# Patient Record
Sex: FEMALE | Race: BLACK OR AFRICAN AMERICAN | NOT HISPANIC OR LATINO | Employment: UNEMPLOYED | RURAL
[De-identification: names, ages, dates, MRNs, and addresses within clinical notes are randomized per-mention and may not be internally consistent; named-entity substitution may affect disease eponyms.]

---

## 2015-04-29 LAB — CRC RECOMMENDATION EXT: NORMAL

## 2022-02-07 ENCOUNTER — OFFICE VISIT (OUTPATIENT)
Dept: FAMILY MEDICINE | Facility: CLINIC | Age: 57
End: 2022-02-07
Payer: OTHER GOVERNMENT

## 2022-02-07 VITALS
HEART RATE: 88 BPM | WEIGHT: 265.63 LBS | TEMPERATURE: 98 F | HEIGHT: 64 IN | OXYGEN SATURATION: 91 % | BODY MASS INDEX: 45.35 KG/M2 | DIASTOLIC BLOOD PRESSURE: 83 MMHG | SYSTOLIC BLOOD PRESSURE: 151 MMHG

## 2022-02-07 DIAGNOSIS — Z79.899 ENCOUNTER FOR LONG-TERM CURRENT USE OF MEDICATION: ICD-10-CM

## 2022-02-07 DIAGNOSIS — K21.9 GASTROESOPHAGEAL REFLUX DISEASE, UNSPECIFIED WHETHER ESOPHAGITIS PRESENT: ICD-10-CM

## 2022-02-07 DIAGNOSIS — E11.9 TYPE 2 DIABETES MELLITUS WITHOUT COMPLICATION, UNSPECIFIED WHETHER LONG TERM INSULIN USE: Primary | ICD-10-CM

## 2022-02-07 DIAGNOSIS — R20.8 BURNING SENSATION OF SKIN: ICD-10-CM

## 2022-02-07 LAB
ALBUMIN SERPL BCP-MCNC: 3.2 G/DL (ref 3.5–5)
ALBUMIN/GLOB SERPL: 0.9 {RATIO}
ALP SERPL-CCNC: 60 U/L (ref 46–118)
ALT SERPL W P-5'-P-CCNC: 22 U/L (ref 13–56)
ANION GAP SERPL CALCULATED.3IONS-SCNC: 5 MMOL/L (ref 7–16)
AST SERPL W P-5'-P-CCNC: 22 U/L (ref 15–37)
BILIRUB SERPL-MCNC: 0.8 MG/DL (ref 0–1.2)
BUN SERPL-MCNC: 19 MG/DL (ref 7–18)
BUN/CREAT SERPL: 17 (ref 6–20)
CALCIUM SERPL-MCNC: 8.8 MG/DL (ref 8.5–10.1)
CHLORIDE SERPL-SCNC: 107 MMOL/L (ref 98–107)
CHOLEST SERPL-MCNC: 136 MG/DL (ref 0–200)
CHOLEST/HDLC SERPL: 2.9 {RATIO}
CO2 SERPL-SCNC: 34 MMOL/L (ref 21–32)
CREAT SERPL-MCNC: 1.15 MG/DL (ref 0.55–1.02)
EKG 12-LEAD: NORMAL
EST. AVERAGE GLUCOSE BLD GHB EST-MCNC: 247 MG/DL
GLOBULIN SER-MCNC: 3.4 G/DL (ref 2–4)
GLUCOSE SERPL-MCNC: 182 MG/DL (ref 74–106)
HBA1C MFR BLD HPLC: 10 % (ref 4.5–6.6)
HDLC SERPL-MCNC: 47 MG/DL (ref 40–60)
LDLC SERPL CALC-MCNC: 47 MG/DL
LDLC/HDLC SERPL: 1 {RATIO}
NONHDLC SERPL-MCNC: 89 MG/DL
POTASSIUM SERPL-SCNC: 4.2 MMOL/L (ref 3.5–5.1)
PR INTERVAL: NORMAL
PROT SERPL-MCNC: 6.6 G/DL (ref 6.4–8.2)
PRT AXES: NORMAL
QRS DURATION: NORMAL
QT/QTC: NORMAL
SODIUM SERPL-SCNC: 142 MMOL/L (ref 136–145)
TRIGL SERPL-MCNC: 212 MG/DL (ref 35–150)
UREA BREATH TEST QL: NEGATIVE
VENTRICULAR RATE: NORMAL
VLDLC SERPL-MCNC: 42 MG/DL

## 2022-02-07 PROCEDURE — 80061 LIPID PANEL: CPT | Mod: ,,, | Performed by: CLINICAL MEDICAL LABORATORY

## 2022-02-07 PROCEDURE — 93005 ELECTROCARDIOGRAM TRACING: CPT | Mod: GC,,, | Performed by: SPECIALIST

## 2022-02-07 PROCEDURE — 80053 COMPREHENSIVE METABOLIC PANEL: ICD-10-PCS | Mod: ,,, | Performed by: CLINICAL MEDICAL LABORATORY

## 2022-02-07 PROCEDURE — 80053 COMPREHEN METABOLIC PANEL: CPT | Mod: ,,, | Performed by: CLINICAL MEDICAL LABORATORY

## 2022-02-07 PROCEDURE — 83014 H PYLORI DRUG ADMIN: CPT | Mod: ,,, | Performed by: CLINICAL MEDICAL LABORATORY

## 2022-02-07 PROCEDURE — 83013 H. PYLORI BREATH TEST: ICD-10-PCS | Mod: ,,, | Performed by: CLINICAL MEDICAL LABORATORY

## 2022-02-07 PROCEDURE — 83014 H. PYLORI BREATH TEST: ICD-10-PCS | Mod: ,,, | Performed by: CLINICAL MEDICAL LABORATORY

## 2022-02-07 PROCEDURE — 93005 POCT EKG 12-LEAD: ICD-10-PCS | Mod: GC,,, | Performed by: SPECIALIST

## 2022-02-07 PROCEDURE — 99204 OFFICE O/P NEW MOD 45 MIN: CPT | Mod: GC,,, | Performed by: SPECIALIST

## 2022-02-07 PROCEDURE — 99204 PR OFFICE/OUTPT VISIT, NEW, LEVL IV, 45-59 MIN: ICD-10-PCS | Mod: GC,,, | Performed by: SPECIALIST

## 2022-02-07 PROCEDURE — 83013 H PYLORI (C-13) BREATH: CPT | Mod: ,,, | Performed by: CLINICAL MEDICAL LABORATORY

## 2022-02-07 PROCEDURE — 80061 LIPID PANEL: ICD-10-PCS | Mod: ,,, | Performed by: CLINICAL MEDICAL LABORATORY

## 2022-02-07 PROCEDURE — 83036 HEMOGLOBIN GLYCOSYLATED A1C: CPT | Mod: ,,, | Performed by: CLINICAL MEDICAL LABORATORY

## 2022-02-07 PROCEDURE — 83036 HEMOGLOBIN A1C: ICD-10-PCS | Mod: ,,, | Performed by: CLINICAL MEDICAL LABORATORY

## 2022-02-07 RX ORDER — DIVALPROEX SODIUM 250 MG/1
500 TABLET, DELAYED RELEASE ORAL 2 TIMES DAILY
COMMUNITY
Start: 2021-12-09 | End: 2022-02-14

## 2022-02-07 RX ORDER — VALSARTAN 160 MG/1
160 TABLET ORAL DAILY
COMMUNITY
Start: 2021-12-09 | End: 2022-02-14 | Stop reason: ALTCHOICE

## 2022-02-07 RX ORDER — ALPRAZOLAM 1 MG/1
1 TABLET ORAL DAILY
COMMUNITY
Start: 2021-12-09 | End: 2022-02-14

## 2022-02-07 RX ORDER — SITAGLIPTIN AND METFORMIN HYDROCHLORIDE 1000; 50 MG/1; MG/1
1 TABLET, FILM COATED ORAL 2 TIMES DAILY WITH MEALS
COMMUNITY
Start: 2022-01-17

## 2022-02-07 RX ORDER — PAROXETINE HYDROCHLORIDE 20 MG/1
TABLET, FILM COATED ORAL
COMMUNITY
Start: 2021-12-09 | End: 2022-02-14

## 2022-02-07 RX ORDER — TRAMADOL HYDROCHLORIDE 50 MG/1
TABLET ORAL
COMMUNITY
Start: 2021-08-23 | End: 2022-02-14

## 2022-02-07 RX ORDER — HYDROXYZINE PAMOATE 25 MG/1
CAPSULE ORAL
COMMUNITY
Start: 2021-12-14 | End: 2022-02-14

## 2022-02-07 RX ORDER — ONDANSETRON 4 MG/1
TABLET, ORALLY DISINTEGRATING ORAL
COMMUNITY
Start: 2022-01-27 | End: 2022-02-14

## 2022-02-07 RX ORDER — SUCRALFATE 1 G/1
TABLET ORAL
COMMUNITY
Start: 2022-01-27 | End: 2022-02-14 | Stop reason: ALTCHOICE

## 2022-02-07 RX ORDER — FAMOTIDINE 40 MG/1
40 TABLET, FILM COATED ORAL DAILY
Qty: 30 TABLET | Refills: 11 | Status: SHIPPED | OUTPATIENT
Start: 2022-02-07 | End: 2023-02-07

## 2022-02-07 RX ORDER — DICLOFENAC SODIUM 10 MG/G
2 GEL TOPICAL 4 TIMES DAILY
Qty: 100 G | Refills: 0 | Status: SHIPPED | OUTPATIENT
Start: 2022-02-07

## 2022-02-07 NOTE — PROGRESS NOTES
"      Chris Arana, DO  [address], [place] [address]  Phone: [phone]     Subjective     Name: Margie Davidson   YOB: 1965 (56 y.o.)  MRN: [id number]  Visit Date: 02/07/2022   Chief Complaint: Gastroesophageal Reflux and Arm Pain (Armpit (feels a burning sensation) x 1 month )        HISTORY OF PRESENT ILLNESS:  Pt is a 55 yo aa female presenting today with cc of "diabetic complaints, neuropathy, and GERD." She states that she began experiencing neuropathy/burning of her right breast in December and that she is wanting to establish care with another PCP in attempts to better control her diabetes. She denies any rash to the breast and insists that it is from neuropathy. After a lengthy discussion, there appears to be some confusion between the patient reported medications and the medications in system, with many medications belonging to the same class. She also states that her most recent a1c was approximately 10 and that her BG is approximately 50 in the mornings; however, there is confusion with her diabetic medications as well. She states that she has also been experiencing GERD for many years and that it is not controlled with OTC PPIs. At this time, I am uncomfortable making changes to any of her medications without knowing current medications and dosages. I have instructed the patient to follow up in 1 week and to bring all medications with her at that time. I will start Pepcid for GERD and Voltaren gel for her burning until we can prescribe a more appropriate medication in 1 week.        PAST MEDICAL HISTORY:  Significant Diagnoses - Patient  has a past medical history of Diabetes mellitus, type 2, GERD (gastroesophageal reflux disease), and Hyperlipidemia.  Medications - Patient has a current medication list which includes the following long-term medication(s): alprazolam, diclofenac sodium, divalproex, famotidine, janumet, paroxetine, and valsartan.   Allergies - Patient has No " Known Allergies.  Surgeries - Patient  has a past surgical history that includes Hysterectomy; Surgical removal of ulcer; and Tubal ligation.  Family History - Patient family history is not on file.      SOCIAL HISTORY:  Tobacco - Patient  reports that she has never smoked. She has never used smokeless tobacco.   Alcohol - Patient  reports no history of alcohol use.   Recreational Drugs - Patient  reports no history of drug use.       Review of Systems   Constitutional: Negative for chills, diaphoresis, fatigue and fever.   Respiratory: Negative for cough, chest tightness, shortness of breath and wheezing.    Cardiovascular: Negative for chest pain, palpitations, leg swelling and claudication.   Gastrointestinal: Positive for nausea and reflux. Negative for abdominal pain, diarrhea and vomiting.   Neurological: Negative for dizziness, syncope, weakness, light-headedness and headaches.   All other systems reviewed and are negative.         Past Medical History:   Diagnosis Date    Diabetes mellitus, type 2     GERD (gastroesophageal reflux disease)     Hyperlipidemia         Review of patient's allergies indicates:  No Known Allergies     Past Surgical History:   Procedure Laterality Date    HYSTERECTOMY      partial    SURGICAL REMOVAL OF ULCER      TUBAL LIGATION          History reviewed. No pertinent family history.    Current Outpatient Medications   Medication Instructions    ALPRAZolam (XANAX) 1 mg, Oral, Daily    diclofenac sodium (VOLTAREN) 2 g, Topical (Top), 4 times daily    divalproex (DEPAKOTE) 500 mg, Oral, 2 times daily    famotidine (PEPCID) 40 mg, Oral, Daily    hydrOXYzine pamoate (VISTARIL) 25 MG Cap TAKE 1 CAPSULE BY MOUTH 4 TIMES DAILY AS NEEDED FOR 5 DAYS    JANUMET 50-1,000 mg per tablet 1 tablet, Oral, 2 times daily with meals    ondansetron (ZOFRAN-ODT) 4 MG TbDL TAKE 1 TABLET AND PLACE ON TOP OF THE TONGUE WHERE THEY WILL DISSOLVE EVERY 4-6 HOURS AS NEEDED FOR NAUSEA     "paroxetine (PAXIL) 20 MG tablet TAKE 1/2 (ONE-HALF) TABLET BY MOUTH ONCE DAILY FOR 2 WEEKS,THEN TAKE 1 TABLET ONCE DAY FOR 2 WEEKS    sucralfate (CARAFATE) 1 gram tablet TAKE ONE TABLET BY MOUTH 30 MINUTES BEFORE MEALS AND AT BEDTIME.    traMADoL (ULTRAM) 50 mg tablet No dose, route, or frequency recorded.    valsartan (DIOVAN) 160 mg, Oral, Daily        Objective     BP (!) 151/83   Pulse 88   Temp 98 °F (36.7 °C) (Temporal)   Ht 5' 4" (1.626 m)   Wt 120.5 kg (265 lb 9.6 oz)   SpO2 (!) 91%   BMI 45.59 kg/m²     Physical Exam  Vitals and nursing note reviewed.   Constitutional:       General: She is not in acute distress.     Appearance: Normal appearance. She is well-developed. She is obese. She is not ill-appearing.   HENT:      Head: Normocephalic and atraumatic.      Nose: Nose normal. No congestion or rhinorrhea.      Mouth/Throat:      Mouth: Mucous membranes are moist.      Pharynx: Oropharynx is clear. No oropharyngeal exudate or posterior oropharyngeal erythema.   Eyes:      General: No scleral icterus.     Extraocular Movements: Extraocular movements intact.      Conjunctiva/sclera: Conjunctivae normal.      Pupils: Pupils are equal, round, and reactive to light.   Cardiovascular:      Rate and Rhythm: Normal rate and regular rhythm.      Pulses: Normal pulses.      Heart sounds: Normal heart sounds, S1 normal and S2 normal. No murmur heard.  No friction rub. No gallop. No S3 or S4 sounds.    Pulmonary:      Effort: Pulmonary effort is normal. No accessory muscle usage, prolonged expiration or respiratory distress.      Breath sounds: Normal breath sounds and air entry. No wheezing, rhonchi or rales.   Abdominal:      General: Abdomen is flat. Bowel sounds are normal. There is no distension.      Palpations: Abdomen is soft.      Tenderness: There is no abdominal tenderness. There is no guarding or rebound.   Musculoskeletal:         General: Normal range of motion.      Cervical back: Normal " range of motion and neck supple.      Right lower leg: No edema.      Left lower leg: No edema.   Skin:     General: Skin is warm and dry.   Neurological:      General: No focal deficit present.      Mental Status: She is alert and oriented to person, place, and time.   Psychiatric:         Mood and Affect: Mood normal.         Behavior: Behavior normal. Behavior is cooperative.         Thought Content: Thought content normal.         Judgment: Judgment normal.          All recently obtained labs have been reviewed and discussed in detail with the patient.   Assessment     1. Type 2 diabetes mellitus without complication, unspecified whether long term insulin use    2. Encounter for long-term current use of medication    3. Gastroesophageal reflux disease, unspecified whether esophagitis present    4. Burning sensation of skin         Plan        Problem List Items Addressed This Visit        Endocrine    Diabetes mellitus, type 2 - Primary     Reassess diabetic neuropathy when patient follows up in 1 week with current medications  - A1c pending         Relevant Medications    JANUMET 50-1,000 mg per tablet       GI    Gastroesophageal reflux disease     Patient states that omeprazole OTC has not worked  - Pepcid prescribed  - follow up 1 week         Relevant Medications    famotidine (PEPCID) 40 MG tablet    Other Relevant Orders    H. pylori Breath Test       Other    Burning sensation of skin     Located on upper lateral aspect of right breast. Patient insist that this is neuropathy. Without knowing the patient current meds, I feel uncomfortable prescribing anything more at this time. She states that she is already taking lyrica and gabapentin. Will try voltaren gel for now.  - voltaren gel         Relevant Medications    diclofenac sodium (VOLTAREN) 1 % Gel      Other Visit Diagnoses     Encounter for long-term current use of medication        Relevant Orders    Comprehensive Metabolic Panel    Lipid Panel     POCT EKG 12-LEAD (NOT FOR [facility] USE) (Completed)    Hemoglobin A1C          Follow up in about 1 week (around 2/14/2022), or if symptoms worsen or fail to improve.    Chris Arana,    [employer]

## 2022-02-07 NOTE — ASSESSMENT & PLAN NOTE
Reassess diabetic neuropathy when patient follows up in 1 week with current medications  - A1c pending

## 2022-02-07 NOTE — ASSESSMENT & PLAN NOTE
Located on upper lateral aspect of right breast. Patient insist that this is neuropathy. Without knowing the patient current meds, I feel uncomfortable prescribing anything more at this time. She states that she is already taking lyrica and gabapentin. Will try voltaren gel for now.  - voltaren gel

## 2022-02-14 ENCOUNTER — OFFICE VISIT (OUTPATIENT)
Dept: FAMILY MEDICINE | Facility: CLINIC | Age: 57
End: 2022-02-14
Payer: OTHER GOVERNMENT

## 2022-02-14 VITALS
DIASTOLIC BLOOD PRESSURE: 67 MMHG | SYSTOLIC BLOOD PRESSURE: 131 MMHG | OXYGEN SATURATION: 95 % | BODY MASS INDEX: 45.14 KG/M2 | TEMPERATURE: 99 F | HEART RATE: 78 BPM | WEIGHT: 263 LBS

## 2022-02-14 DIAGNOSIS — I10 HYPERTENSION, UNSPECIFIED TYPE: ICD-10-CM

## 2022-02-14 DIAGNOSIS — E78.5 HYPERLIPIDEMIA, UNSPECIFIED HYPERLIPIDEMIA TYPE: ICD-10-CM

## 2022-02-14 DIAGNOSIS — K21.9 GASTROESOPHAGEAL REFLUX DISEASE, UNSPECIFIED WHETHER ESOPHAGITIS PRESENT: ICD-10-CM

## 2022-02-14 DIAGNOSIS — E11.9 TYPE 2 DIABETES MELLITUS WITHOUT COMPLICATION, UNSPECIFIED WHETHER LONG TERM INSULIN USE: ICD-10-CM

## 2022-02-14 DIAGNOSIS — R20.8 BURNING SENSATION OF SKIN: Primary | ICD-10-CM

## 2022-02-14 DIAGNOSIS — F31.9 BIPOLAR 1 DISORDER: ICD-10-CM

## 2022-02-14 LAB — GLUCOSE SERPL-MCNC: 187 MG/DL (ref 70–110)

## 2022-02-14 PROCEDURE — 82962 POCT GLUCOSE, HAND-HELD DEVICE: ICD-10-PCS | Mod: QW,GC,, | Performed by: SPECIALIST

## 2022-02-14 PROCEDURE — 82962 GLUCOSE BLOOD TEST: CPT | Mod: QW,GC,, | Performed by: SPECIALIST

## 2022-02-14 PROCEDURE — 99213 OFFICE O/P EST LOW 20 MIN: CPT | Mod: GC,,, | Performed by: SPECIALIST

## 2022-02-14 PROCEDURE — 99213 PR OFFICE/OUTPT VISIT, EST, LEVL III, 20-29 MIN: ICD-10-PCS | Mod: GC,,, | Performed by: SPECIALIST

## 2022-02-14 RX ORDER — LURASIDONE HYDROCHLORIDE 40 MG/1
80 TABLET, FILM COATED ORAL DAILY
COMMUNITY

## 2022-02-14 RX ORDER — PREGABALIN 75 MG/1
1 CAPSULE ORAL 3 TIMES DAILY
COMMUNITY
Start: 2022-02-03

## 2022-02-14 RX ORDER — SUCRALFATE 1 G/1
1 TABLET ORAL
COMMUNITY
End: 2022-02-14 | Stop reason: ALTCHOICE

## 2022-02-14 RX ORDER — FERROUS SULFATE 325(65) MG
325 TABLET, DELAYED RELEASE (ENTERIC COATED) ORAL DAILY
COMMUNITY

## 2022-02-14 RX ORDER — ASPIRIN 81 MG/1
81 TABLET ORAL DAILY
COMMUNITY

## 2022-02-14 RX ORDER — LOSARTAN POTASSIUM 100 MG/1
100 TABLET ORAL DAILY
COMMUNITY

## 2022-02-14 RX ORDER — MULTIVITAMIN
1 TABLET ORAL DAILY
COMMUNITY

## 2022-02-14 RX ORDER — AMITRIPTYLINE HYDROCHLORIDE 25 MG/1
25 TABLET, FILM COATED ORAL NIGHTLY
Qty: 30 TABLET | Refills: 0 | Status: SHIPPED | OUTPATIENT
Start: 2022-02-14 | End: 2022-03-16

## 2022-02-14 RX ORDER — ALUMINUM HYDROXIDE, MAGNESIUM HYDROXIDE, AND SIMETHICONE 2400; 240; 2400 MG/30ML; MG/30ML; MG/30ML
SUSPENSION ORAL EVERY 6 HOURS PRN
COMMUNITY

## 2022-02-14 RX ORDER — AMOXICILLIN 500 MG
1 CAPSULE ORAL 2 TIMES DAILY
COMMUNITY

## 2022-02-14 RX ORDER — GABAPENTIN 600 MG/1
600 TABLET ORAL 3 TIMES DAILY
COMMUNITY
End: 2022-02-14 | Stop reason: SINTOL

## 2022-02-14 RX ORDER — ATORVASTATIN CALCIUM 80 MG/1
80 TABLET, FILM COATED ORAL DAILY
COMMUNITY

## 2022-02-14 NOTE — PROGRESS NOTES
"      Chris Arana,   [address], [place] [address]  Phone: [phone]     Subjective     Name: Margie Davidson   YOB: 1965 (56 y.o.)  MRN: [id number]  Visit Date: 02/14/2022   Chief Complaint: Follow-up and Medication Refill (Atorvastatin, gabapentin, losartan, aspirin, iron)        HISTORY OF PRESENT ILLNESS:  Pt is a 55 yo female presenting today with cc of "follow up for burning sensation." Patient presented 1 week ago complaining of a severe acid reflux and a burning sensation that she thought was 2/2 neuropathy located on the right chest wall radiating towards her breast. She states that she thought this was 2/2 her diabetes. At that time, there was confusion with the patients medication list and she was instructed to follow up in 1 week and to bring her medications.  Today the patient states that the acid reflux has resolved with the addition of Pepcid last visit. The burning pain is still present under the right arm and on the breast, she states that the voltaren gel did help but only lasted for approximately 30 minutes after each application. Patient also states that her BG is remaining high I (around 130-150) in the mornings, but dropping to the mid 50s each evening, and she reports taking 80 units insulin in the am and 60 in the pm. After reviewing the patients medications, it was determined that patient was taking sulfacrate each morning in addition to her other medications. My current thoughts are that her janumet was not being properly absorbed in the mornings, resulting in high BG in the am followed by low BG in the pm. It was also determined that the patient had been taking both gabapentin and lyrica, which could explain this neuropathic type pain she is experiencing in her chest wall and breast. Today we will stop her gabapentin and sulfacrate, decrease her insulin to 50 units bid, and begin 25mg of elavil for neuropathic pain.        PAST MEDICAL HISTORY:  Significant " Diagnoses - Patient  has a past medical history of Diabetes mellitus, type 2, GERD (gastroesophageal reflux disease), and Hyperlipidemia.  Medications - Patient has a current medication list which includes the following long-term medication(s): aluminum & magnesium hydroxide-simethicone, aspirin, atorvastatin, famotidine, janumet, losartan, lurasidone, fish oil-omega-3 fatty acids, pregabalin, amitriptyline, diclofenac sodium, and insulin nph-insulin regular (70/30).   Allergies - Patient has No Known Allergies.  Surgeries - Patient  has a past surgical history that includes Hysterectomy; Surgical removal of ulcer; and Tubal ligation.  Family History - Patient family history is not on file.      SOCIAL HISTORY:  Tobacco - Patient  reports that she has never smoked. She has never used smokeless tobacco.   Alcohol - Patient  reports no history of alcohol use.   Recreational Drugs - Patient  reports no history of drug use.       Review of Systems   Constitutional: Negative for chills, diaphoresis, fatigue and fever.   Respiratory: Negative for cough, chest tightness, shortness of breath and wheezing.    Cardiovascular: Negative for chest pain, palpitations, leg swelling and claudication.   Gastrointestinal: Negative for abdominal pain, diarrhea, nausea, vomiting and reflux.   Integumentary:         Complaining of burning sensation on right chest wall and into her right breast.   Neurological: Negative for dizziness, syncope, weakness, light-headedness and headaches.   All other systems reviewed and are negative.         Past Medical History:   Diagnosis Date    Diabetes mellitus, type 2     GERD (gastroesophageal reflux disease)     Hyperlipidemia         Review of patient's allergies indicates:  No Known Allergies     Past Surgical History:   Procedure Laterality Date    HYSTERECTOMY      partial    SURGICAL REMOVAL OF ULCER      TUBAL LIGATION          History reviewed. No pertinent family history.    Current  Outpatient Medications   Medication Instructions    aluminum & magnesium hydroxide-simethicone (MYLANTA MAX STRENGTH) 400-400-40 mg/5 mL suspension Oral, Every 6 hours PRN    amitriptyline (ELAVIL) 25 mg, Oral, Nightly    aspirin (ECOTRIN) 81 mg, Oral, Daily    atorvastatin (LIPITOR) 80 mg, Oral, Daily    diclofenac sodium (VOLTAREN) 2 g, Topical (Top), 4 times daily    famotidine (PEPCID) 40 mg, Oral, Daily    ferrous sulfate 325 mg, Oral, Daily    insulin NPH-insulin regular, 70/30, (NOVOLIN 70/30 U-100 INSULIN) 100 unit/mL (70-30) injection 50 Units, Subcutaneous, 2 times daily, Inject 50 units twice a day    JANUMET 50-1,000 mg per tablet 1 tablet, Oral, 2 times daily with meals    losartan (COZAAR) 100 mg, Oral, Daily    lurasidone (LATUDA) 80 mg, Oral, Daily, Take with evening meal    multivitamin (THERAGRAN) per tablet 1 tablet, Oral, Daily, Take one tablet my mouth once a day for supplement Mutivitamins w/minerals cap/tablet no iron    omega-3 fatty acids/fish oil (FISH OIL-OMEGA-3 FATTY ACIDS) 300-1,000 mg capsule 1 capsule, Oral, 2 times daily    pregabalin (LYRICA) 75 MG capsule 1 capsule, Oral, 3 times daily, Take one capsule every morning and 2 capsules at bedtime        Objective     /67   Pulse 78   Temp 98.8 °F (37.1 °C) (Temporal)   Wt 119.3 kg (263 lb)   SpO2 95%   BMI 45.14 kg/m²     Physical Exam  Vitals and nursing note reviewed.   Constitutional:       General: She is not in acute distress.     Appearance: Normal appearance. She is well-developed. She is obese. She is not ill-appearing.   HENT:      Head: Normocephalic and atraumatic.      Nose: Nose normal. No congestion or rhinorrhea.      Mouth/Throat:      Mouth: Mucous membranes are moist.      Pharynx: Oropharynx is clear. No oropharyngeal exudate or posterior oropharyngeal erythema.   Eyes:      General: No scleral icterus.     Extraocular Movements: Extraocular movements intact.      Conjunctiva/sclera:  Conjunctivae normal.      Pupils: Pupils are equal, round, and reactive to light.   Cardiovascular:      Rate and Rhythm: Normal rate and regular rhythm.      Pulses: Normal pulses.      Heart sounds: Normal heart sounds, S1 normal and S2 normal. No murmur heard.  No friction rub. No gallop. No S3 or S4 sounds.    Pulmonary:      Effort: Pulmonary effort is normal. No accessory muscle usage, prolonged expiration or respiratory distress.      Breath sounds: Normal breath sounds and air entry. No wheezing, rhonchi or rales.   Abdominal:      General: Abdomen is flat. Bowel sounds are normal. There is no distension.      Palpations: Abdomen is soft.      Tenderness: There is no abdominal tenderness. There is no guarding or rebound.   Musculoskeletal:         General: Normal range of motion.      Cervical back: Normal range of motion and neck supple.      Right lower leg: No edema.      Left lower leg: No edema.   Skin:     General: Skin is warm and dry.          Neurological:      General: No focal deficit present.      Mental Status: She is alert and oriented to person, place, and time.   Psychiatric:         Mood and Affect: Mood normal.         Behavior: Behavior normal. Behavior is cooperative.         Thought Content: Thought content normal.         Judgment: Judgment normal.          All recently obtained labs have been reviewed and discussed in detail with the patient.   Assessment     1. Burning sensation of skin    2. Type 2 diabetes mellitus without complication, unspecified whether long term insulin use    3. Gastroesophageal reflux disease, unspecified whether esophagitis present    4. Hypertension, unspecified type    5. Bipolar 1 disorder    6. Hyperlipidemia, unspecified hyperlipidemia type         Plan        Problem List Items Addressed This Visit        Psychiatric    Bipolar 1 disorder    Relevant Medications    lurasidone (LATUDA) 40 mg Tab tablet       Cardiac/Vascular    HTN (hypertension)     Relevant Medications    losartan (COZAAR) 100 MG tablet       Endocrine    Diabetes mellitus, type 2    Relevant Medications    pregabalin (LYRICA) 75 MG capsule    insulin NPH-insulin regular, 70/30, (NOVOLIN 70/30 U-100 INSULIN) 100 unit/mL (70-30) injection    Other Relevant Orders    POCT Glucose, Hand-Held Device    Ambulatory referral/consult to Diabetes Education       GI    Gastroesophageal reflux disease       Other    Burning sensation of skin - Primary    Relevant Medications    amitriptyline (ELAVIL) 25 MG tablet      Other Visit Diagnoses     Hyperlipidemia, unspecified hyperlipidemia type        Relevant Medications    atorvastatin (LIPITOR) 80 MG tablet          Follow up in about 1 month (around 3/14/2022).    Chris Arana DO   [employer]

## 2022-04-20 ENCOUNTER — TELEPHONE (OUTPATIENT)
Dept: FAMILY MEDICINE | Facility: CLINIC | Age: 57
End: 2022-04-20
Payer: MEDICAID

## 2022-04-20 NOTE — TELEPHONE ENCOUNTER
pt had an apt scheduled for Monday 4/18/2022.Patient was contacted on 4/15/22 to remind of appt. Patient no showed to appt.

## 2023-03-10 ENCOUNTER — PATIENT OUTREACH (OUTPATIENT)
Dept: FAMILY MEDICINE | Facility: CLINIC | Age: 58
End: 2023-03-10
Payer: OTHER GOVERNMENT